# Patient Record
Sex: MALE | Race: WHITE | NOT HISPANIC OR LATINO | Employment: FULL TIME | URBAN - METROPOLITAN AREA
[De-identification: names, ages, dates, MRNs, and addresses within clinical notes are randomized per-mention and may not be internally consistent; named-entity substitution may affect disease eponyms.]

---

## 2024-04-15 ENCOUNTER — HOSPITAL ENCOUNTER (EMERGENCY)
Facility: HOSPITAL | Age: 34
Discharge: HOME | End: 2024-04-15
Attending: STUDENT IN AN ORGANIZED HEALTH CARE EDUCATION/TRAINING PROGRAM

## 2024-04-15 ENCOUNTER — APPOINTMENT (OUTPATIENT)
Dept: RADIOLOGY | Facility: HOSPITAL | Age: 34
End: 2024-04-15

## 2024-04-15 VITALS
HEART RATE: 77 BPM | SYSTOLIC BLOOD PRESSURE: 138 MMHG | DIASTOLIC BLOOD PRESSURE: 98 MMHG | HEIGHT: 69 IN | WEIGHT: 216 LBS | BODY MASS INDEX: 31.99 KG/M2 | RESPIRATION RATE: 16 BRPM | TEMPERATURE: 97.2 F | OXYGEN SATURATION: 100 %

## 2024-04-15 DIAGNOSIS — S16.1XXA STRAIN OF NECK MUSCLE, INITIAL ENCOUNTER: Primary | ICD-10-CM

## 2024-04-15 LAB
ALBUMIN SERPL-MCNC: 4.7 G/DL (ref 3.5–5)
ALP BLD-CCNC: 84 U/L (ref 35–125)
ALT SERPL-CCNC: 36 U/L (ref 5–40)
ANION GAP SERPL CALC-SCNC: 9 MMOL/L
AST SERPL-CCNC: 23 U/L (ref 5–40)
BASOPHILS # BLD AUTO: 0.04 X10*3/UL (ref 0–0.1)
BASOPHILS NFR BLD AUTO: 0.6 %
BILIRUB SERPL-MCNC: 0.4 MG/DL (ref 0.1–1.2)
BUN SERPL-MCNC: 14 MG/DL (ref 8–25)
CALCIUM SERPL-MCNC: 9.6 MG/DL (ref 8.5–10.4)
CHLORIDE SERPL-SCNC: 103 MMOL/L (ref 97–107)
CO2 SERPL-SCNC: 28 MMOL/L (ref 24–31)
CREAT SERPL-MCNC: 1.1 MG/DL (ref 0.4–1.6)
EGFRCR SERPLBLD CKD-EPI 2021: 90 ML/MIN/1.73M*2
EOSINOPHIL # BLD AUTO: 0.09 X10*3/UL (ref 0–0.7)
EOSINOPHIL NFR BLD AUTO: 1.3 %
ERYTHROCYTE [DISTWIDTH] IN BLOOD BY AUTOMATED COUNT: 12.5 % (ref 11.5–14.5)
GLUCOSE SERPL-MCNC: 93 MG/DL (ref 65–99)
HCT VFR BLD AUTO: 48.3 % (ref 41–52)
HETEROPH AB SERPLBLD QL IA.RAPID: NEGATIVE
HGB BLD-MCNC: 16.6 G/DL (ref 13.5–17.5)
IMM GRANULOCYTES # BLD AUTO: 0.04 X10*3/UL (ref 0–0.7)
IMM GRANULOCYTES NFR BLD AUTO: 0.6 % (ref 0–0.9)
LYMPHOCYTES # BLD AUTO: 2.05 X10*3/UL (ref 1.2–4.8)
LYMPHOCYTES NFR BLD AUTO: 30.6 %
MCH RBC QN AUTO: 30.6 PG (ref 26–34)
MCHC RBC AUTO-ENTMCNC: 34.4 G/DL (ref 32–36)
MCV RBC AUTO: 89 FL (ref 80–100)
MONOCYTES # BLD AUTO: 0.54 X10*3/UL (ref 0.1–1)
MONOCYTES NFR BLD AUTO: 8.1 %
NEUTROPHILS # BLD AUTO: 3.93 X10*3/UL (ref 1.2–7.7)
NEUTROPHILS NFR BLD AUTO: 58.8 %
NRBC BLD-RTO: 0 /100 WBCS (ref 0–0)
PLATELET # BLD AUTO: 210 X10*3/UL (ref 150–450)
POTASSIUM SERPL-SCNC: 4.8 MMOL/L (ref 3.4–5.1)
PROT SERPL-MCNC: 7.6 G/DL (ref 5.9–7.9)
RBC # BLD AUTO: 5.43 X10*6/UL (ref 4.5–5.9)
S PYO DNA THROAT QL NAA+PROBE: NOT DETECTED
SODIUM SERPL-SCNC: 140 MMOL/L (ref 133–145)
WBC # BLD AUTO: 6.7 X10*3/UL (ref 4.4–11.3)

## 2024-04-15 PROCEDURE — 80053 COMPREHEN METABOLIC PANEL: CPT

## 2024-04-15 PROCEDURE — 70491 CT SOFT TISSUE NECK W/DYE: CPT | Performed by: STUDENT IN AN ORGANIZED HEALTH CARE EDUCATION/TRAINING PROGRAM

## 2024-04-15 PROCEDURE — 85025 COMPLETE CBC W/AUTO DIFF WBC: CPT

## 2024-04-15 PROCEDURE — 99284 EMERGENCY DEPT VISIT MOD MDM: CPT | Mod: 25 | Performed by: STUDENT IN AN ORGANIZED HEALTH CARE EDUCATION/TRAINING PROGRAM

## 2024-04-15 PROCEDURE — 86308 HETEROPHILE ANTIBODY SCREEN: CPT

## 2024-04-15 PROCEDURE — 36415 COLL VENOUS BLD VENIPUNCTURE: CPT

## 2024-04-15 PROCEDURE — 2550000001 HC RX 255 CONTRASTS: Performed by: STUDENT IN AN ORGANIZED HEALTH CARE EDUCATION/TRAINING PROGRAM

## 2024-04-15 PROCEDURE — 87651 STREP A DNA AMP PROBE: CPT

## 2024-04-15 PROCEDURE — 70491 CT SOFT TISSUE NECK W/DYE: CPT

## 2024-04-15 RX ORDER — ACETAMINOPHEN 325 MG/1
975 TABLET ORAL ONCE
Status: COMPLETED | OUTPATIENT
Start: 2024-04-15 | End: 2024-04-15

## 2024-04-15 RX ADMIN — ACETAMINOPHEN 975 MG: 325 TABLET ORAL at 11:32

## 2024-04-15 RX ADMIN — IOHEXOL 75 ML: 350 INJECTION, SOLUTION INTRAVENOUS at 12:40

## 2024-04-15 ASSESSMENT — LIFESTYLE VARIABLES
TOTAL SCORE: 0
EVER HAD A DRINK FIRST THING IN THE MORNING TO STEADY YOUR NERVES TO GET RID OF A HANGOVER: NO
HAVE YOU EVER FELT YOU SHOULD CUT DOWN ON YOUR DRINKING: NO
HAVE PEOPLE ANNOYED YOU BY CRITICIZING YOUR DRINKING: NO
EVER FELT BAD OR GUILTY ABOUT YOUR DRINKING: NO

## 2024-04-15 ASSESSMENT — PAIN DESCRIPTION - PAIN TYPE: TYPE: ACUTE PAIN

## 2024-04-15 ASSESSMENT — PAIN SCALES - GENERAL: PAINLEVEL_OUTOF10: 4

## 2024-04-15 ASSESSMENT — PAIN DESCRIPTION - LOCATION: LOCATION: NECK

## 2024-04-15 ASSESSMENT — PAIN - FUNCTIONAL ASSESSMENT: PAIN_FUNCTIONAL_ASSESSMENT: 0-10

## 2024-04-15 NOTE — DISCHARGE INSTRUCTIONS
Please refer to the muscle strain and attached instructions.  You may use Tylenol or ibuprofen for your pain.  Follow-up with primary care regarding your ER visit.  You may present back to ER if you develop any worsening symptoms such as difficulty swallowing, high fevers or chills.

## 2024-04-15 NOTE — ED PROVIDER NOTES
HPI   Chief Complaint   Patient presents with    Neck Pain       Patient is a 33-year-old male referred to ER by urgent care for evaluation of neck pain.  Patient states that he has right-sided neck pain with point tenderness right over his carotid artery.  He states it is worse when he turns his neck sometimes.  He denies any injury or trauma to the neck.  States that sometimes is aggravated when he is chewing or swallowing.  He denies any difficulty with p.o. intake.,  Chest pain, shortness of breath.  He is also endorsing a headache behind his right eye that seems to have improved today.  He states he also felt this neck pain approximately 4 weeks ago but it resolved spontaneously after a few days.  States he was concerned today because this has been going on for over a week now.  He has no other acute complaints at this time.                        [place] Coma Scale Score: 15                     Patient History   No past medical history on file.  No past surgical history on file.  No family history on file.  Social History     Tobacco Use    Smoking status: Not on file    Smokeless tobacco: Not on file   Substance Use Topics    Alcohol use: Not on file    Drug use: Not on file       Physical Exam   ED Triage Vitals [04/15/24 1015]   Temperature Heart Rate Respirations BP   36.2 °C (97.2 °F) 77 16 (!) 138/98      Pulse Ox Temp Source Heart Rate Source Patient Position   100 % Temporal Radial Sitting      BP Location FiO2 (%)     Left arm --       Physical Exam  Vitals and nursing note reviewed.   Constitutional:       General: He is not in acute distress.     Appearance: He is well-developed.      Comments: Well-appearing male resting in exam chair in no acute distress   HENT:      Head: Normocephalic and atraumatic.   Eyes:      Conjunctiva/sclera: Conjunctivae normal.      Comments: No submandibular tenderness.  No dental abscess or peritonsillar abscess observed.   Neck:      Comments: Normal range of motion  and no midline spinal tenderness of the neck.  There is point tenderness to palpation over the anterior right side of the neck without evidence of lymphadenopathy.  No difficulty swallowing or tolerating secretions  Cardiovascular:      Rate and Rhythm: Normal rate and regular rhythm.      Heart sounds: No murmur heard.  Pulmonary:      Effort: Pulmonary effort is normal. No respiratory distress.      Breath sounds: Normal breath sounds.   Abdominal:      Palpations: Abdomen is soft.      Tenderness: There is no abdominal tenderness.   Musculoskeletal:         General: No swelling.      Cervical back: No rigidity or tenderness.   Skin:     General: Skin is warm and dry.      Capillary Refill: Capillary refill takes less than 2 seconds.   Neurological:      Mental Status: He is alert.   Psychiatric:         Mood and Affect: Mood normal.         ED Course & MDM   Diagnoses as of 04/15/24 1831   Strain of neck muscle, initial encounter       Medical Decision Making  Parts of this chart have been completed using voice recognition software. Please excuse any errors of transcription.  My thought process and reason for plan has been formulated from the time that I saw the patient until the time of disposition and is not specific to one specific moment during their visit and furthermore my MDM encompasses this entire chart and not only this text box.      HPI: Detailed above.    Exam: A medically appropriate exam performed, outlined above, given the known history and presentation.    History obtained from: Patient    Social Determinants of Health considered during this visit: Lives independently    Medications given during visit:  Medications   acetaminophen (Tylenol) tablet 975 mg (975 mg oral Given 4/15/24 1132)   iohexol (OMNIPaque) 350 mg iodine/mL solution 75 mL (75 mL intravenous Given 4/15/24 1240)        Diagnostic/tests  Labs Reviewed   GROUP A STREPTOCOCCUS, PCR - Normal       Result Value    Group A Strep PCR Not  Detected     COMPREHENSIVE METABOLIC PANEL - Normal    Glucose 93      Sodium 140      Potassium 4.8      Chloride 103      Bicarbonate 28      Urea Nitrogen 14      Creatinine 1.10      eGFR 90      Calcium 9.6      Albumin 4.7      Alkaline Phosphatase 84      Total Protein 7.6      AST 23      Bilirubin, Total 0.4      ALT 36      Anion Gap 9     MONONUCLEOSIS SCREEN (HETEROPHILE ANTIBODY) - Normal    Mononucleosis Screen Negative     CBC WITH AUTO DIFFERENTIAL    WBC 6.7      nRBC 0.0      RBC 5.43      Hemoglobin 16.6      Hematocrit 48.3      MCV 89      MCH 30.6      MCHC 34.4      RDW 12.5      Platelets 210      Neutrophils % 58.8      Immature Granulocytes %, Automated 0.6      Lymphocytes % 30.6      Monocytes % 8.1      Eosinophils % 1.3      Basophils % 0.6      Neutrophils Absolute 3.93      Immature Granulocytes Absolute, Automated 0.04      Lymphocytes Absolute 2.05      Monocytes Absolute 0.54      Eosinophils Absolute 0.09      Basophils Absolute 0.04        CT soft tissue neck w IV contrast   Final Result   No soft tissue mass, fluid collection, or lymphadenopathy in the neck.        MACRO:   None        Signed by: Zia Andrew 4/15/2024 1:05 PM   Dictation workstation:   [id number]           Considerations/further MDM:  33-year-old well-appearing male referred by urgent care for evaluation of right-sided neck pain.  During the ER visit the patient is in no acute distress resting comfortably in exam chair.  On exam, he has no evidence of lymphadenopathy or mass.  He has focal tenderness to palpation over his right carotid artery that is worse when he turns his head to the left.  There is no soft tissue swelling or submandibular tenderness.  I considered acute viral illness, lymphadenopathy, muscle strain, aneurysm, cancerous mass.  Basic laboratory workup without acute abnormalities.  Strep and mono PCR is unremarkable.  Patient was provided Tylenol in the ER with improvement of his symptoms.   CT soft tissue neck was negative for any acute findings such as lymphadenopathy, mass, edema, aneurysm.  Patient remained well-appearing with improvement of his symptoms thus I do not believe additional imaging or laboratory studies are necessary at this time.  This was discussed with the patient he was agreeable.  Clinically the patient's symptoms do correlate with a neck muscle strain.  Patient was advised to continue taking over-the-counter analgesics and to follow-up with his primary care provider regarding his symptoms.  He was given return precautions such as worsening pain despite treatment, shortness of breath.  He stated his understanding of the treatment and follow-up plan and was agreeable.  I saw this patient in conjunction with .  Please refer to his note for additional details regarding patient case.      Procedure  Procedures     Marcia Beltran PA-C  04/15/24 3138

## 2024-04-15 NOTE — Clinical Note
Lino Culp was seen and treated in our emergency department on 4/15/2024.  He may return to work on 04/16/2024.       If you have any questions or concerns, please don't hesitate to call.      Marcia Beltran PA-C

## 2024-04-15 NOTE — ED TRIAGE NOTES
"Neck pain x1 week \" Woke up with the pain in my neck, HA and pressure in the eye. Pain comes and goes. This happened once before and just went away but has been a week. I went to Urgent Care today and they told me to come here\" per pt.   "